# Patient Record
Sex: FEMALE | Race: WHITE | NOT HISPANIC OR LATINO | URBAN - METROPOLITAN AREA
[De-identification: names, ages, dates, MRNs, and addresses within clinical notes are randomized per-mention and may not be internally consistent; named-entity substitution may affect disease eponyms.]

---

## 2022-06-04 ENCOUNTER — TELEPHONE ENCOUNTER (OUTPATIENT)
Dept: URBAN - METROPOLITAN AREA CLINIC 68 | Facility: CLINIC | Age: 77
End: 2022-06-04

## 2022-06-05 ENCOUNTER — TELEPHONE ENCOUNTER (OUTPATIENT)
Dept: URBAN - METROPOLITAN AREA CLINIC 68 | Facility: CLINIC | Age: 77
End: 2022-06-05

## 2022-06-05 RX ORDER — GEMFIBROZIL 600 MG/1
TABLET, FILM COATED ORAL
Status: ACTIVE | COMMUNITY
Start: 2005-06-06

## 2022-06-05 RX ORDER — LACTASE 3000 UNIT
TABLET ORAL AS DIRECTED
Status: ACTIVE | COMMUNITY
Start: 2005-06-17

## 2022-06-05 RX ORDER — FUROSEMIDE 40 MG/1
TABLET ORAL AS NEEDED
Status: ACTIVE | COMMUNITY
Start: 2005-06-06

## 2022-06-05 RX ORDER — ALENDRONATE SODIUM 70 MG
TABLET ORAL
Status: ACTIVE | COMMUNITY
Start: 2005-06-06

## 2022-06-25 ENCOUNTER — TELEPHONE ENCOUNTER (OUTPATIENT)
Age: 77
End: 2022-06-25

## 2022-06-26 ENCOUNTER — TELEPHONE ENCOUNTER (OUTPATIENT)
Age: 77
End: 2022-06-26

## 2022-06-26 RX ORDER — GEMFIBROZIL 600 MG/1
TABLET, FILM COATED ORAL
Status: ACTIVE | COMMUNITY
Start: 2005-06-06

## 2022-06-26 RX ORDER — LEVOTHYROXINE SODIUM 100 MCG
SYNTHROID(    1 TABLET DAILY ) ACTIVE -HX ENTRY TABLET ORAL
Status: ACTIVE | COMMUNITY
Start: 2005-06-06

## 2022-06-26 RX ORDER — DICYCLOMINE HYDROCHLORIDE 20 MG/2ML
BENTYL(    1 CAPSULE 3 TIMES DAILY ) ACTIVE -HX ENTRY INJECTION, SOLUTION INTRAMUSCULAR
Status: ACTIVE | COMMUNITY
Start: 2005-06-20

## 2022-06-26 RX ORDER — LACTASE 3000 UNIT
TABLET ORAL AS DIRECTED
Status: ACTIVE | COMMUNITY
Start: 2005-06-17

## 2022-06-26 RX ORDER — FUROSEMIDE 40 MG/1
TABLET ORAL AS NEEDED
Status: ACTIVE | COMMUNITY
Start: 2005-06-06

## 2022-06-26 RX ORDER — ALENDRONATE SODIUM 70 MG/1
TABLET ORAL
Status: ACTIVE | COMMUNITY
Start: 2005-06-06

## 2022-08-10 ENCOUNTER — OFFICE VISIT (OUTPATIENT)
Dept: URBAN - METROPOLITAN AREA CLINIC 68 | Facility: CLINIC | Age: 77
End: 2022-08-10

## 2022-08-10 RX ORDER — AMITRIPTYLINE HYDROCHLORIDE 10 MG/1
1 TABLET AT BEDTIME TABLET, FILM COATED ORAL ONCE A DAY
Qty: 30 | Refills: 2 | OUTPATIENT

## 2022-08-10 RX ORDER — FUROSEMIDE 40 MG/1
TABLET ORAL AS NEEDED
Status: ACTIVE | COMMUNITY
Start: 2005-06-06

## 2022-08-10 RX ORDER — GEMFIBROZIL 600 MG/1
TABLET, FILM COATED ORAL
Status: DISCONTINUED | COMMUNITY
Start: 2005-06-06

## 2022-08-10 RX ORDER — ZOLPIDEM TARTRATE 5 MG/1
1 TABLET AT BEDTIME TABLET, FILM COATED ORAL ONCE A DAY
Status: ACTIVE | COMMUNITY

## 2022-08-10 RX ORDER — LACTASE 3000 UNIT
TABLET ORAL AS DIRECTED
Status: DISCONTINUED | COMMUNITY
Start: 2005-06-17

## 2022-08-10 RX ORDER — ALENDRONATE SODIUM 70 MG
TABLET ORAL
Status: DISCONTINUED | COMMUNITY
Start: 2005-06-06

## 2022-08-10 RX ORDER — MONTELUKAST 10 MG/1
1 TABLET TABLET, FILM COATED ORAL ONCE A DAY
Status: ACTIVE | COMMUNITY

## 2022-08-10 RX ORDER — HYDROXYCHLOROQUINE SULFATE 200 MG/1
AS DIRECTED TABLET, FILM COATED ORAL
Status: ACTIVE | COMMUNITY

## 2022-08-10 NOTE — EXAM-MIGRATED EXAMINATIONS
General Examination: General appearance: -> alert, pleasant, well-nourished and in no acute distress   Head: -> normocephalic, atraumatic   Eyes: -> normal   Neck / thyroid: -> neck is supple, with full range of motion and no cervical lymphadenopathy   Skin: -> skin is warm and dry, with no rashes, good skin turgor and normal hair distribution   Heart: -> regular rate and rhythm without murmurs, gallops, clicks or rubs   Lungs: -> clear to auscultation bilaterally, with good air movement and no rales, rhonchi or wheezes   Chest: -> chest wall with no costochondral junction tenderness, no rib deformity and normal shape and expansion   Abdomen: -> soft with good bowel sounds, nontender, and no masses or hepatosplenomegaly , negative Godwin's sign   Extremities: -> normal extremity with no clubbing, cyanosis or edema   Neurologic: -> alert and oriented

## 2022-08-10 NOTE — HPI-MIGRATED HPI
Transition to Care : 77 y.o. WF with chronic diarrhea who is here for evaluation of change in bowel habits and bloating. She does have Lupus and is on chronic Prednisone. She is s/p normal EGD and colonoscopy in 2005. She had a normal colonoscopy in 2014 with Dr. Galo was normal. She has tried Miralax, Metamucil. and Linzess in the past and then experienced fecal urgency and diarrhea. She did have a negative Cologuard 3 years ago. She denies any gross gib. She is currently taking a pre and probiotic. She is recommended to have another Cologuard and start Amitryptiline 10 mg qHS. She is recommended to try Ibgard prn.

## 2022-08-30 ENCOUNTER — TELEPHONE ENCOUNTER (OUTPATIENT)
Dept: URBAN - METROPOLITAN AREA CLINIC 68 | Facility: CLINIC | Age: 77
End: 2022-08-30

## 2022-08-30 RX ORDER — ZOLPIDEM TARTRATE 5 MG/1
1 TABLET AT BEDTIME TABLET, FILM COATED ORAL ONCE A DAY
COMMUNITY

## 2022-08-30 RX ORDER — FUROSEMIDE 40 MG/1
TABLET ORAL AS NEEDED
COMMUNITY
Start: 2005-06-06

## 2022-08-30 RX ORDER — SOD SULF/POT CHLORIDE/MAG SULF 1.479 G
AS DIRECTED TABLET ORAL ONCE
Qty: 1 KIT | OUTPATIENT

## 2022-08-30 RX ORDER — AMITRIPTYLINE HYDROCHLORIDE 10 MG/1
1 TABLET AT BEDTIME TABLET, FILM COATED ORAL ONCE A DAY
Qty: 30 | Refills: 2 | COMMUNITY

## 2022-08-30 RX ORDER — HYDROXYCHLOROQUINE SULFATE 200 MG/1
AS DIRECTED TABLET, FILM COATED ORAL
COMMUNITY

## 2022-08-30 RX ORDER — MONTELUKAST 10 MG/1
1 TABLET TABLET, FILM COATED ORAL ONCE A DAY
COMMUNITY

## 2022-09-21 ENCOUNTER — OFFICE VISIT (OUTPATIENT)
Dept: URBAN - METROPOLITAN AREA CLINIC 68 | Facility: CLINIC | Age: 77
End: 2022-09-21

## 2022-09-21 RX ORDER — ZOLPIDEM TARTRATE 5 MG/1
1 TABLET AT BEDTIME TABLET, FILM COATED ORAL ONCE A DAY
COMMUNITY

## 2022-09-21 RX ORDER — MONTELUKAST 10 MG/1
1 TABLET TABLET, FILM COATED ORAL ONCE A DAY
COMMUNITY

## 2022-09-21 RX ORDER — FUROSEMIDE 40 MG/1
TABLET ORAL AS NEEDED
COMMUNITY
Start: 2005-06-06

## 2022-09-21 RX ORDER — BELIMUMAB 200 MG/ML
AS DIRECTED SOLUTION SUBCUTANEOUS
Status: ACTIVE | COMMUNITY

## 2022-09-21 RX ORDER — HYDROXYCHLOROQUINE SULFATE 200 MG/1
AS DIRECTED TABLET, FILM COATED ORAL
COMMUNITY

## 2022-09-21 RX ORDER — SOD SULF/POT CHLORIDE/MAG SULF 1.479 G
AS DIRECTED TABLET ORAL ONCE
Qty: 1 KIT | Status: DISCONTINUED | COMMUNITY

## 2022-09-21 RX ORDER — SOD SULF/POT CHLORIDE/MAG SULF 1.479 G
AS DIRECTED TABLET ORAL
OUTPATIENT
Start: 2022-09-21

## 2022-09-21 RX ORDER — AMITRIPTYLINE HYDROCHLORIDE 10 MG/1
1 TABLET AT BEDTIME TABLET, FILM COATED ORAL ONCE A DAY
Qty: 30 | Refills: 2 | Status: DISCONTINUED | COMMUNITY

## 2022-09-21 NOTE — HPI-MIGRATED HPI
Transition to Care : 77 y.o. WF with Lupus who is having significant abdominal bloating and gas who is here for recent postive Cologuard. She is scheduled for a colonoscopy next weeks. She is still having significant bloating and gas. She does have fecal urgency and was not able to tolerate Amitryptiline due to tachycardia. She is recommended to try Ibgard and will consider SIBO breath test if EGD and colonoscopy unremarkable. Plan for colonoscopy with random colon biopsies.

## 2022-10-11 ENCOUNTER — OFFICE VISIT (OUTPATIENT)
Dept: URBAN - METROPOLITAN AREA SURGERY CENTER 12 | Facility: SURGERY CENTER | Age: 77
End: 2022-10-11

## 2022-10-11 ENCOUNTER — TELEPHONE ENCOUNTER (OUTPATIENT)
Dept: URBAN - METROPOLITAN AREA CLINIC 68 | Facility: CLINIC | Age: 77
End: 2022-10-11

## 2022-10-11 RX ORDER — ZOLPIDEM TARTRATE 5 MG/1
1 TABLET AT BEDTIME TABLET, FILM COATED ORAL ONCE A DAY
COMMUNITY

## 2022-10-11 RX ORDER — PANTOPRAZOLE SODIUM 40 MG/1
1 TABLET TABLET, DELAYED RELEASE ORAL ONCE A DAY
Qty: 30 | Refills: 1 | COMMUNITY

## 2022-10-11 RX ORDER — HYDROXYCHLOROQUINE SULFATE 200 MG/1
AS DIRECTED TABLET, FILM COATED ORAL
COMMUNITY

## 2022-10-11 RX ORDER — PANTOPRAZOLE SODIUM 40 MG/1
1 TABLET TABLET, DELAYED RELEASE ORAL ONCE A DAY
Qty: 90 TABLET | Refills: 1 | OUTPATIENT

## 2022-10-11 RX ORDER — SOD SULF/POT CHLORIDE/MAG SULF 1.479 G
AS DIRECTED TABLET ORAL
COMMUNITY
Start: 2022-09-21

## 2022-10-11 RX ORDER — MONTELUKAST 10 MG/1
1 TABLET TABLET, FILM COATED ORAL ONCE A DAY
COMMUNITY

## 2022-10-11 RX ORDER — SOD SULF/POT CHLORIDE/MAG SULF 1.479 G
AS DIRECTED TABLET ORAL
Status: ACTIVE | COMMUNITY
Start: 2022-09-21

## 2022-10-11 RX ORDER — BELIMUMAB 200 MG/ML
AS DIRECTED SOLUTION SUBCUTANEOUS
Status: ACTIVE | COMMUNITY

## 2022-10-11 RX ORDER — PANTOPRAZOLE SODIUM 40 MG/1
1 TABLET TABLET, DELAYED RELEASE ORAL ONCE A DAY
Qty: 30 | Refills: 1 | OUTPATIENT

## 2022-10-11 RX ORDER — BELIMUMAB 200 MG/ML
AS DIRECTED SOLUTION SUBCUTANEOUS
COMMUNITY

## 2022-10-11 RX ORDER — FUROSEMIDE 40 MG/1
TABLET ORAL AS NEEDED
COMMUNITY
Start: 2005-06-06

## 2022-10-19 ENCOUNTER — OFFICE VISIT (OUTPATIENT)
Dept: URBAN - METROPOLITAN AREA CLINIC 68 | Facility: CLINIC | Age: 77
End: 2022-10-19

## 2022-10-19 ENCOUNTER — TELEPHONE ENCOUNTER (OUTPATIENT)
Dept: URBAN - METROPOLITAN AREA CLINIC 68 | Facility: CLINIC | Age: 77
End: 2022-10-19

## 2022-10-19 RX ORDER — PANTOPRAZOLE SODIUM 40 MG/1
1 TABLET TABLET, DELAYED RELEASE ORAL ONCE A DAY
Qty: 30 | Refills: 1 | COMMUNITY

## 2022-10-19 RX ORDER — SOD SULF/POT CHLORIDE/MAG SULF 1.479 G
AS DIRECTED TABLET ORAL
COMMUNITY
Start: 2022-09-21

## 2022-10-19 RX ORDER — ZOLPIDEM TARTRATE 5 MG/1
1 TABLET AT BEDTIME TABLET, FILM COATED ORAL ONCE A DAY
COMMUNITY

## 2022-10-19 RX ORDER — HYDROXYCHLOROQUINE SULFATE 200 MG/1
AS DIRECTED TABLET, FILM COATED ORAL
COMMUNITY

## 2022-10-19 RX ORDER — PANTOPRAZOLE SODIUM 40 MG/1
1 TABLET TABLET, DELAYED RELEASE ORAL ONCE A DAY
Qty: 90 TABLET | Refills: 1 | COMMUNITY

## 2022-10-19 RX ORDER — MONTELUKAST 10 MG/1
1 TABLET TABLET, FILM COATED ORAL ONCE A DAY
COMMUNITY

## 2022-10-19 RX ORDER — FUROSEMIDE 40 MG/1
TABLET ORAL AS NEEDED
COMMUNITY
Start: 2005-06-06

## 2022-10-19 RX ORDER — BELIMUMAB 200 MG/ML
AS DIRECTED SOLUTION SUBCUTANEOUS
COMMUNITY

## 2022-10-19 RX ORDER — FLUCONAZOLE 100 MG/1
1 TABLET TABLET ORAL
Qty: 1 | Status: ACTIVE | COMMUNITY

## 2022-10-19 RX ORDER — FLUCONAZOLE 100 MG/1
1 TABLET TABLET ORAL
Qty: 1 | OUTPATIENT

## 2022-10-19 NOTE — HPI-MIGRATED HPI
Transition to Care : 77 y.o. WF with a epigastric dyspepsia, GERD who agrees to telemedicine visit follow up. She is s/p an EGD and colonoscopy in 10/2022 which showed a small hiatal hernia, mild gastritis, and distal esophageal Candidiasis. On colonoscopy, there were two adenomatous colon polyps, diverticulosis, and IH.

## 2022-10-24 ENCOUNTER — TELEPHONE ENCOUNTER (OUTPATIENT)
Dept: URBAN - METROPOLITAN AREA CLINIC 68 | Facility: CLINIC | Age: 77
End: 2022-10-24

## 2022-10-25 ENCOUNTER — LAB OUTSIDE AN ENCOUNTER (OUTPATIENT)
Dept: URBAN - METROPOLITAN AREA CLINIC 68 | Facility: CLINIC | Age: 77
End: 2022-10-25

## 2022-10-26 LAB — ALPHA-1-ANTITRYPSIN QN: 202

## 2022-10-27 ENCOUNTER — LAB OUTSIDE AN ENCOUNTER (OUTPATIENT)
Dept: URBAN - METROPOLITAN AREA CLINIC 68 | Facility: CLINIC | Age: 77
End: 2022-10-27

## 2022-10-27 ENCOUNTER — TELEPHONE ENCOUNTER (OUTPATIENT)
Dept: URBAN - METROPOLITAN AREA CLINIC 68 | Facility: CLINIC | Age: 77
End: 2022-10-27

## 2022-10-28 ENCOUNTER — TELEPHONE ENCOUNTER (OUTPATIENT)
Dept: URBAN - METROPOLITAN AREA CLINIC 68 | Facility: CLINIC | Age: 77
End: 2022-10-28

## 2022-10-28 RX ORDER — ZOLPIDEM TARTRATE 5 MG/1
1 TABLET AT BEDTIME TABLET, FILM COATED ORAL ONCE A DAY
COMMUNITY

## 2022-10-28 RX ORDER — BELIMUMAB 200 MG/ML
AS DIRECTED SOLUTION SUBCUTANEOUS
COMMUNITY

## 2022-10-28 RX ORDER — FLUCONAZOLE 100 MG/1
1 TABLET TABLET ORAL
Qty: 1 | COMMUNITY

## 2022-10-28 RX ORDER — FLUCONAZOLE 100 MG/1
1 TABLET TABLET ORAL DAILY
Qty: 10 TABLET | OUTPATIENT

## 2022-10-28 RX ORDER — SOD SULF/POT CHLORIDE/MAG SULF 1.479 G
AS DIRECTED TABLET ORAL
COMMUNITY
Start: 2022-09-21

## 2022-10-28 RX ORDER — HYDROXYCHLOROQUINE SULFATE 200 MG/1
AS DIRECTED TABLET, FILM COATED ORAL
COMMUNITY

## 2022-10-28 RX ORDER — PANTOPRAZOLE SODIUM 40 MG/1
1 TABLET TABLET, DELAYED RELEASE ORAL ONCE A DAY
Qty: 90 TABLET | Refills: 1 | COMMUNITY

## 2022-10-28 RX ORDER — PANTOPRAZOLE SODIUM 40 MG/1
1 TABLET TABLET, DELAYED RELEASE ORAL ONCE A DAY
Qty: 30 | Refills: 1 | COMMUNITY

## 2022-10-28 RX ORDER — MONTELUKAST 10 MG/1
1 TABLET TABLET, FILM COATED ORAL ONCE A DAY
COMMUNITY

## 2022-10-28 RX ORDER — FUROSEMIDE 40 MG/1
TABLET ORAL AS NEEDED
COMMUNITY
Start: 2005-06-06

## 2022-11-04 LAB — PANCREATIC ELASTASE 1: >500

## 2022-11-05 ENCOUNTER — TELEPHONE ENCOUNTER (OUTPATIENT)
Dept: URBAN - METROPOLITAN AREA CLINIC 68 | Facility: CLINIC | Age: 77
End: 2022-11-05

## 2022-11-16 ENCOUNTER — OFFICE VISIT (OUTPATIENT)
Dept: URBAN - METROPOLITAN AREA CLINIC 68 | Facility: CLINIC | Age: 77
End: 2022-11-16

## 2022-11-17 ENCOUNTER — TELEPHONE ENCOUNTER (OUTPATIENT)
Dept: URBAN - METROPOLITAN AREA CLINIC 68 | Facility: CLINIC | Age: 77
End: 2022-11-17

## 2022-11-17 RX ORDER — RIFAXIMIN 550 MG/1
1 TABLET TABLET ORAL THREE TIMES A DAY
Qty: 42 TABLET | Refills: 2 | OUTPATIENT

## 2022-11-17 RX ORDER — HYDROXYCHLOROQUINE SULFATE 200 MG/1
AS DIRECTED TABLET, FILM COATED ORAL
COMMUNITY

## 2022-11-17 RX ORDER — BELIMUMAB 200 MG/ML
AS DIRECTED SOLUTION SUBCUTANEOUS
COMMUNITY

## 2022-11-17 RX ORDER — FLUCONAZOLE 100 MG/1
1 TABLET TABLET ORAL
Qty: 1 | COMMUNITY

## 2022-11-17 RX ORDER — MONTELUKAST 10 MG/1
1 TABLET TABLET, FILM COATED ORAL ONCE A DAY
COMMUNITY

## 2022-11-17 RX ORDER — PANTOPRAZOLE SODIUM 40 MG/1
1 TABLET TABLET, DELAYED RELEASE ORAL ONCE A DAY
Qty: 90 TABLET | Refills: 1 | COMMUNITY

## 2022-11-17 RX ORDER — PANTOPRAZOLE SODIUM 40 MG/1
1 TABLET TABLET, DELAYED RELEASE ORAL ONCE A DAY
Qty: 30 | Refills: 1 | COMMUNITY

## 2022-11-17 RX ORDER — SOD SULF/POT CHLORIDE/MAG SULF 1.479 G
AS DIRECTED TABLET ORAL
COMMUNITY
Start: 2022-09-21

## 2022-11-17 RX ORDER — ZOLPIDEM TARTRATE 5 MG/1
1 TABLET AT BEDTIME TABLET, FILM COATED ORAL ONCE A DAY
COMMUNITY

## 2022-11-17 RX ORDER — FUROSEMIDE 40 MG/1
TABLET ORAL AS NEEDED
COMMUNITY
Start: 2005-06-06

## 2022-11-17 RX ORDER — FLUCONAZOLE 100 MG/1
1 TABLET TABLET ORAL DAILY
Qty: 10 TABLET | COMMUNITY

## 2022-12-05 ENCOUNTER — OFFICE VISIT (OUTPATIENT)
Dept: URBAN - METROPOLITAN AREA CLINIC 68 | Facility: CLINIC | Age: 77
End: 2022-12-05

## 2022-12-05 ENCOUNTER — TELEPHONE ENCOUNTER (OUTPATIENT)
Dept: URBAN - METROPOLITAN AREA CLINIC 68 | Facility: CLINIC | Age: 77
End: 2022-12-05

## 2022-12-05 RX ORDER — PANTOPRAZOLE SODIUM 40 MG/1
1 TABLET TABLET, DELAYED RELEASE ORAL ONCE A DAY
Qty: 30 | Refills: 1 | COMMUNITY

## 2022-12-05 RX ORDER — ZOLPIDEM TARTRATE 5 MG/1
1 TABLET AT BEDTIME TABLET, FILM COATED ORAL ONCE A DAY
COMMUNITY

## 2022-12-05 RX ORDER — FLUCONAZOLE 100 MG/1
1 TABLET TABLET ORAL DAILY
Qty: 10 TABLET | COMMUNITY

## 2022-12-05 RX ORDER — HYDROXYCHLOROQUINE SULFATE 200 MG/1
AS DIRECTED TABLET, FILM COATED ORAL
COMMUNITY

## 2022-12-05 RX ORDER — SOD SULF/POT CHLORIDE/MAG SULF 1.479 G
AS DIRECTED TABLET ORAL
COMMUNITY
Start: 2022-09-21

## 2022-12-05 RX ORDER — BELIMUMAB 200 MG/ML
AS DIRECTED SOLUTION SUBCUTANEOUS
COMMUNITY

## 2022-12-05 RX ORDER — RIFAXIMIN 550 MG/1
1 TABLET TABLET ORAL THREE TIMES A DAY
Qty: 42 TABLET | Refills: 2 | Status: DISCONTINUED | COMMUNITY

## 2022-12-05 RX ORDER — FLUCONAZOLE 100 MG/1
1 TABLET TABLET ORAL
Qty: 1 | COMMUNITY

## 2022-12-05 RX ORDER — FUROSEMIDE 40 MG/1
TABLET ORAL AS NEEDED
COMMUNITY
Start: 2005-06-06

## 2022-12-05 RX ORDER — PANTOPRAZOLE SODIUM 40 MG/1
1 TABLET TABLET, DELAYED RELEASE ORAL ONCE A DAY
Qty: 90 TABLET | Refills: 1 | COMMUNITY

## 2022-12-05 RX ORDER — MONTELUKAST 10 MG/1
1 TABLET TABLET, FILM COATED ORAL ONCE A DAY
COMMUNITY

## 2022-12-05 NOTE — HPI-MIGRATED HPI
Transition to Care : 77 y.o. WF with TRENT who agrees to phone visit follow up. She did have an EGD and colonoscopy on 10/2022 which showed hiatal hernia, esophagitis, gastritis, colon polyps, diverticulosis and IH. She is planning to follow up with her PCP, Dr. Mccloud tomorrow and will ask about IV iron referral. She reports being recently admitted to ED wiht Hgb of 8. She denies any gross gib, no melena, no n/v. She is recommended to have further evaluation with a small bowel capsule study.

## 2022-12-06 ENCOUNTER — TELEPHONE ENCOUNTER (OUTPATIENT)
Dept: URBAN - METROPOLITAN AREA CLINIC 68 | Facility: CLINIC | Age: 77
End: 2022-12-06

## 2022-12-18 ENCOUNTER — TELEPHONE ENCOUNTER (OUTPATIENT)
Dept: URBAN - METROPOLITAN AREA CLINIC 68 | Facility: CLINIC | Age: 77
End: 2022-12-18

## 2022-12-19 ENCOUNTER — TELEPHONE ENCOUNTER (OUTPATIENT)
Dept: URBAN - METROPOLITAN AREA CLINIC 68 | Facility: CLINIC | Age: 77
End: 2022-12-19

## 2022-12-21 ENCOUNTER — TELEPHONE ENCOUNTER (OUTPATIENT)
Dept: URBAN - METROPOLITAN AREA CLINIC 68 | Facility: CLINIC | Age: 77
End: 2022-12-21

## 2022-12-22 ENCOUNTER — TELEPHONE ENCOUNTER (OUTPATIENT)
Dept: URBAN - METROPOLITAN AREA CLINIC 68 | Facility: CLINIC | Age: 77
End: 2022-12-22

## 2022-12-23 ENCOUNTER — OFFICE VISIT (OUTPATIENT)
Dept: URBAN - METROPOLITAN AREA CLINIC 68 | Facility: CLINIC | Age: 77
End: 2022-12-23

## 2022-12-23 RX ORDER — HYDROXYCHLOROQUINE SULFATE 200 MG/1
AS DIRECTED TABLET, FILM COATED ORAL
COMMUNITY

## 2022-12-23 RX ORDER — SOD SULF/POT CHLORIDE/MAG SULF 1.479 G
AS DIRECTED TABLET ORAL
COMMUNITY
Start: 2022-09-21

## 2022-12-23 RX ORDER — MONTELUKAST 10 MG/1
1 TABLET TABLET, FILM COATED ORAL ONCE A DAY
COMMUNITY

## 2022-12-23 RX ORDER — FUROSEMIDE 40 MG/1
TABLET ORAL AS NEEDED
COMMUNITY
Start: 2005-06-06

## 2022-12-23 RX ORDER — FLUCONAZOLE 100 MG/1
1 TABLET TABLET ORAL DAILY
Qty: 10 TABLET | COMMUNITY

## 2022-12-23 RX ORDER — FLUCONAZOLE 100 MG/1
1 TABLET TABLET ORAL
Qty: 1 | COMMUNITY

## 2022-12-23 RX ORDER — ZOLPIDEM TARTRATE 5 MG/1
1 TABLET AT BEDTIME TABLET, FILM COATED ORAL ONCE A DAY
COMMUNITY

## 2022-12-23 RX ORDER — PANTOPRAZOLE SODIUM 40 MG/1
1 TABLET TABLET, DELAYED RELEASE ORAL ONCE A DAY
Qty: 90 TABLET | Refills: 1 | COMMUNITY

## 2022-12-23 RX ORDER — BELIMUMAB 200 MG/ML
AS DIRECTED SOLUTION SUBCUTANEOUS
COMMUNITY

## 2022-12-23 RX ORDER — PANTOPRAZOLE SODIUM 40 MG/1
1 TABLET TABLET, DELAYED RELEASE ORAL ONCE A DAY
Qty: 30 | Refills: 1 | COMMUNITY

## 2022-12-23 NOTE — HPI-MIGRATED HPI
Transition to Care : 77 y.o. WF with Lupus and TRENT who agrees to telemedicine clinic visit follow up for recent HIDA scan which was abnormal with an EF of 8%. She does feel fatigued and did perform small bowel capsule study. She denies any gross gib and has received IV iron. She does have some abdominal bloating and dyspepsia.

## 2023-01-03 ENCOUNTER — OFFICE VISIT (OUTPATIENT)
Dept: URBAN - METROPOLITAN AREA CLINIC 68 | Facility: CLINIC | Age: 78
End: 2023-01-03

## 2023-01-03 ENCOUNTER — TELEPHONE ENCOUNTER (OUTPATIENT)
Dept: URBAN - METROPOLITAN AREA CLINIC 68 | Facility: CLINIC | Age: 78
End: 2023-01-03

## 2023-01-03 RX ORDER — FUROSEMIDE 40 MG/1
TABLET ORAL AS NEEDED
COMMUNITY
Start: 2005-06-06

## 2023-01-03 RX ORDER — FLUCONAZOLE 100 MG/1
1 TABLET TABLET ORAL
Qty: 1 | COMMUNITY

## 2023-01-03 RX ORDER — HYDROXYCHLOROQUINE SULFATE 200 MG/1
AS DIRECTED TABLET, FILM COATED ORAL
COMMUNITY

## 2023-01-03 RX ORDER — MONTELUKAST 10 MG/1
1 TABLET TABLET, FILM COATED ORAL ONCE A DAY
COMMUNITY

## 2023-01-03 RX ORDER — SOD SULF/POT CHLORIDE/MAG SULF 1.479 G
AS DIRECTED TABLET ORAL
COMMUNITY
Start: 2022-09-21

## 2023-01-03 RX ORDER — PANTOPRAZOLE SODIUM 40 MG/1
1 TABLET TABLET, DELAYED RELEASE ORAL ONCE A DAY
Qty: 30 | Refills: 1 | COMMUNITY

## 2023-01-03 RX ORDER — ZOLPIDEM TARTRATE 5 MG/1
1 TABLET AT BEDTIME TABLET, FILM COATED ORAL ONCE A DAY
COMMUNITY

## 2023-01-03 RX ORDER — PANTOPRAZOLE SODIUM 40 MG/1
1 TABLET TABLET, DELAYED RELEASE ORAL ONCE A DAY
Qty: 90 TABLET | Refills: 1 | COMMUNITY

## 2023-01-03 RX ORDER — FLUCONAZOLE 100 MG/1
1 TABLET TABLET ORAL DAILY
Qty: 10 TABLET | COMMUNITY

## 2023-01-03 RX ORDER — BELIMUMAB 200 MG/ML
AS DIRECTED SOLUTION SUBCUTANEOUS
COMMUNITY

## 2023-01-03 NOTE — HPI-MIGRATED HPI
Transition to Care : 77 y.o. WF with SLE, TRENT, and biliary dyskinesia who agrees to telemedicine clinic visit follow up. She does have abdominal bloating and did have an abnormal HIDA scan with EF of 8% and has been referred to general surgeon for evaluation of laparoscopic cholecystectomy. She does have dyspeptic symptoms. She denies any gross gib and did have a small bowel capsule study for further evaluation of TRENT. Her small bowel capsule study showed non-bleeding small bowel angioectasias, appearance of small bowel white lesions and spots, and mild gastritis. She is s/p an EGD and colonoscopy in 10/2022 which showed erosive gastritis, esophagitis, colon polyps, diverticulosis and IH. She does have a f/u appt with her PCP and hematology in regards to TRENT. She is recommended to have a small bowel imaging and will consider small bowel enteroscopy if persistent drop in Hgb and iron despite iron replacement.

## 2023-01-04 ENCOUNTER — TELEPHONE ENCOUNTER (OUTPATIENT)
Dept: URBAN - METROPOLITAN AREA CLINIC 68 | Facility: CLINIC | Age: 78
End: 2023-01-04

## 2023-03-31 ENCOUNTER — TELEPHONE ENCOUNTER (OUTPATIENT)
Dept: URBAN - METROPOLITAN AREA CLINIC 68 | Facility: CLINIC | Age: 78
End: 2023-03-31

## 2023-04-14 ENCOUNTER — TELEPHONE ENCOUNTER (OUTPATIENT)
Dept: URBAN - METROPOLITAN AREA CLINIC 68 | Facility: CLINIC | Age: 78
End: 2023-04-14

## 2023-04-19 ENCOUNTER — TELEPHONE ENCOUNTER (OUTPATIENT)
Dept: URBAN - METROPOLITAN AREA CLINIC 68 | Facility: CLINIC | Age: 78
End: 2023-04-19

## 2023-04-21 ENCOUNTER — TELEPHONE ENCOUNTER (OUTPATIENT)
Dept: URBAN - METROPOLITAN AREA CLINIC 68 | Facility: CLINIC | Age: 78
End: 2023-04-21

## 2023-04-21 ENCOUNTER — OFFICE VISIT (OUTPATIENT)
Dept: URBAN - METROPOLITAN AREA CLINIC 68 | Facility: CLINIC | Age: 78
End: 2023-04-21

## 2023-04-21 RX ORDER — BELIMUMAB 200 MG/ML
AS DIRECTED SOLUTION SUBCUTANEOUS
COMMUNITY

## 2023-04-21 RX ORDER — SOD SULF/POT CHLORIDE/MAG SULF 1.479 G
AS DIRECTED TABLET ORAL
COMMUNITY
Start: 2022-09-21

## 2023-04-21 RX ORDER — MONTELUKAST 10 MG/1
1 TABLET TABLET, FILM COATED ORAL ONCE A DAY
COMMUNITY

## 2023-04-21 RX ORDER — FLUCONAZOLE 100 MG/1
1 TABLET TABLET ORAL
Qty: 1 | COMMUNITY

## 2023-04-21 RX ORDER — HYDROXYCHLOROQUINE SULFATE 200 MG/1
AS DIRECTED TABLET, FILM COATED ORAL
COMMUNITY

## 2023-04-21 RX ORDER — ZOLPIDEM TARTRATE 5 MG/1
1 TABLET AT BEDTIME TABLET, FILM COATED ORAL ONCE A DAY
COMMUNITY

## 2023-04-21 RX ORDER — PANTOPRAZOLE SODIUM 40 MG/1
1 TABLET TABLET, DELAYED RELEASE ORAL ONCE A DAY
Qty: 90 TABLET | Refills: 1 | COMMUNITY

## 2023-04-21 RX ORDER — PANTOPRAZOLE SODIUM 40 MG/1
1 TABLET TABLET, DELAYED RELEASE ORAL ONCE A DAY
Qty: 30 | Refills: 1 | COMMUNITY

## 2023-04-21 RX ORDER — FUROSEMIDE 40 MG/1
TABLET ORAL AS NEEDED
COMMUNITY
Start: 2005-06-06

## 2023-04-21 RX ORDER — FLUCONAZOLE 100 MG/1
1 TABLET TABLET ORAL DAILY
Qty: 10 TABLET | COMMUNITY

## 2023-04-21 NOTE — HPI-MIGRATED HPI
Transition to Care : 77 y.o. WF with a history of breast cancer and s/p bilateral breast mastectomy in 1991 and Radiation therapy who agrees to telemedicine clinic visit follow up. She had recent MRI of abdomen which showed hepatic lesions suspicious for malignany. A recent CT/PET scan showed numerous hepatic lesions and skeletal lesions concerning for metastasis. She does describe having headache and feeling lightheaded at times. She does have cholelithiasis and was planning to have upcoming cholecystectomy with Dr. Villa in May.

## 2023-04-24 ENCOUNTER — TELEPHONE ENCOUNTER (OUTPATIENT)
Dept: URBAN - METROPOLITAN AREA CLINIC 68 | Facility: CLINIC | Age: 78
End: 2023-04-24

## 2023-06-15 ENCOUNTER — DASHBOARD ENCOUNTERS (OUTPATIENT)
Age: 78
End: 2023-06-15

## 2023-06-16 ENCOUNTER — OFFICE VISIT (OUTPATIENT)
Dept: URBAN - METROPOLITAN AREA CLINIC 68 | Facility: CLINIC | Age: 78
End: 2023-06-16

## 2023-06-16 RX ORDER — HYDROXYCHLOROQUINE SULFATE 200 MG/1
AS DIRECTED TABLET, FILM COATED ORAL
COMMUNITY

## 2023-06-16 RX ORDER — FLUCONAZOLE 100 MG/1
1 TABLET TABLET ORAL
Qty: 1 | COMMUNITY

## 2023-06-16 RX ORDER — BELIMUMAB 200 MG/ML
AS DIRECTED SOLUTION SUBCUTANEOUS
COMMUNITY

## 2023-06-16 RX ORDER — ZOLPIDEM TARTRATE 5 MG/1
1 TABLET AT BEDTIME TABLET, FILM COATED ORAL ONCE A DAY
COMMUNITY

## 2023-06-16 RX ORDER — MONTELUKAST 10 MG/1
1 TABLET TABLET, FILM COATED ORAL ONCE A DAY
COMMUNITY

## 2023-06-16 RX ORDER — PANTOPRAZOLE SODIUM 40 MG/1
1 TABLET TABLET, DELAYED RELEASE ORAL ONCE A DAY
Qty: 90 TABLET | Refills: 1 | COMMUNITY

## 2023-06-16 RX ORDER — FUROSEMIDE 40 MG/1
TABLET ORAL AS NEEDED
COMMUNITY
Start: 2005-06-06

## 2023-06-16 RX ORDER — PANTOPRAZOLE SODIUM 40 MG/1
1 TABLET TABLET, DELAYED RELEASE ORAL ONCE A DAY
Qty: 30 | Refills: 1 | COMMUNITY

## 2023-06-16 RX ORDER — FLUCONAZOLE 100 MG/1
1 TABLET TABLET ORAL DAILY
Qty: 10 TABLET | COMMUNITY

## 2023-06-16 RX ORDER — SOD SULF/POT CHLORIDE/MAG SULF 1.479 G
AS DIRECTED TABLET ORAL
COMMUNITY
Start: 2022-09-21